# Patient Record
Sex: MALE | Employment: OTHER | URBAN - METROPOLITAN AREA
[De-identification: names, ages, dates, MRNs, and addresses within clinical notes are randomized per-mention and may not be internally consistent; named-entity substitution may affect disease eponyms.]

---

## 2018-03-28 ENCOUNTER — TELEPHONE (OUTPATIENT)
Dept: CARDIOTHORACIC SURGERY | Facility: CLINIC | Age: 78
End: 2018-03-28

## 2018-03-28 NOTE — TELEPHONE ENCOUNTER
An attempt was made to contact patient to collect demographic and insc information prior to visit. A message was left to contact our office.

## 2018-03-30 ENCOUNTER — TELEPHONE (OUTPATIENT)
Dept: SCHEDULING | Facility: CLINIC | Age: 78
End: 2018-03-30

## 2018-04-06 ENCOUNTER — OFFICE VISIT (OUTPATIENT)
Dept: CARDIOTHORACIC SURGERY | Facility: CLINIC | Age: 78
End: 2018-04-06
Payer: MEDICARE

## 2018-04-06 VITALS
OXYGEN SATURATION: 92 % | DIASTOLIC BLOOD PRESSURE: 17 MMHG | HEART RATE: 58 BPM | WEIGHT: 196 LBS | SYSTOLIC BLOOD PRESSURE: 128 MMHG

## 2018-04-06 DIAGNOSIS — I71.40 ABDOMINAL AORTIC ANEURYSM (AAA) WITHOUT RUPTURE (CMS/HCC): Primary | ICD-10-CM

## 2018-04-06 DIAGNOSIS — I71.40 ABDOMINAL AORTIC ANEURYSM (AAA) 3.0 CM TO 5.5 CM IN DIAMETER IN MALE (CMS/HCC): ICD-10-CM

## 2018-04-06 PROCEDURE — 99205 OFFICE O/P NEW HI 60 MIN: CPT | Performed by: THORACIC SURGERY (CARDIOTHORACIC VASCULAR SURGERY)

## 2018-04-06 RX ORDER — ASPIRIN 81 MG/1
81 TABLET ORAL DAILY
COMMUNITY

## 2018-04-06 RX ORDER — CETIRIZINE HYDROCHLORIDE 10 MG/1
10 TABLET ORAL DAILY
COMMUNITY

## 2018-04-06 RX ORDER — TRIAMCINOLONE ACETONIDE 1 MG/G
CREAM TOPICAL 2 TIMES DAILY
COMMUNITY

## 2018-04-06 RX ORDER — NITROGLYCERIN 0.4 MG/1
0.4 TABLET SUBLINGUAL EVERY 5 MIN PRN
COMMUNITY

## 2018-04-06 RX ORDER — SIMVASTATIN 40 MG/1
40 TABLET, FILM COATED ORAL NIGHTLY
COMMUNITY

## 2018-04-06 RX ORDER — IPRATROPIUM BROMIDE 21 UG/1
2 SPRAY, METERED NASAL
COMMUNITY

## 2018-04-06 NOTE — LETTER
April 10, 2018     Gary Bay MD  [address] [place] [address]    Patient: Gideon Harmon   YOB: 1940   Date of Visit: 4/6/2018       Dear Dr. Bay:    Thank you for referring Gideon Harmon to me for evaluation. Below are my notes for this consultation.    If you have questions, please do not hesitate to call me. I look forward to following your patient along with you.         Sincerely,        Magalys Lucas MD        CC: MD Paige Barnes PA C  4/10/2018  9:26 AM  Signed  Mr. Harmon is a 77-year-old male who presents to our aortic wellness clinic for evaluation of an incidentally found infrarenal abdominal aortic aneurysm which has now been followed for several years.  He follows regularly with his cardiologist, Dr. Soriano.  He denies any abdominal pain.  His past medical history significant for coronary artery bypass grafting ×4 performed in 2003, hypertension and hypercholesterolemia.  He has a family history significant for his father who had with the patient believes was an infected aortic stent graft.  His brother also had a prior AAA repair.  He currently denies any associated symptoms, including chest pain or upper back pain.  The patient wished to come see us today to have an opinion from an aortic specialist and for review of his latest CT scan which was performed on 6/27/2017.   Review of Systems   Constitution: Negative.   HENT: Negative.    Eyes: Negative.    Respiratory: Negative.    Endocrine: Negative.    Skin: Negative.    Musculoskeletal: Negative.    Gastrointestinal: Negative.    Genitourinary: Negative.    Neurological: Negative.    Psychiatric/Behavioral: Negative.    Allergic/Immunologic: Negative.      Physical Exam   Constitutional: He is oriented to person, place, and time. He appears well-developed and well-nourished. No distress.   HENT:   Head: Normocephalic and atraumatic.   Right Ear: External ear normal.   Left  Ear: External ear normal.   Nose: Nose normal.   Mouth/Throat: Oropharynx is clear and moist. No oropharyngeal exudate.   Eyes: Conjunctivae and EOM are normal. Pupils are equal, round, and reactive to light. Right eye exhibits no discharge. No scleral icterus.   Neck: Normal range of motion. Neck supple.   Cardiovascular: Normal rate, regular rhythm, S1 normal, S2 normal and intact distal pulses.    No murmur heard.  Pulmonary/Chest: Effort normal and breath sounds normal. No respiratory distress. He has no wheezes. He has no rales. He exhibits no tenderness.   Abdominal: Soft. Bowel sounds are normal. He exhibits no distension and no mass. There is no tenderness. There is no rebound and no guarding.   Musculoskeletal: Normal range of motion. He exhibits no edema, tenderness or deformity.   Neurological: He is alert and oriented to person, place, and time. He has normal reflexes.   Skin: Skin is warm and dry. No rash noted. No erythema. No pallor.   Psychiatric: He has a normal mood and affect. His behavior is normal. Judgment and thought content normal.       No Known Allergies      Current Outpatient Prescriptions   Medication Sig Dispense Refill   • aspirin 81 mg enteric coated tablet Take 81 mg by mouth daily.     • cetirizine (ZyrTEC) 10 mg tablet Take 10 mg by mouth daily.     • ipratropium (ATROVENT) 0.03 % nasal spray Administer 2 sprays into each nostril every 12 (twelve) hours.     • multivitamin tablet Take by mouth daily.     • nitroglycerin (NITROSTAT) 0.4 mg SL tablet Place 0.4 mg under the tongue every 5 (five) minutes as needed for chest pain.     • psyllium husk, aspartame, (METAMUCIL) 3.4 gram/5.8 gram powder Take by mouth daily.     • simvastatin (ZOCOR) 40 mg tablet Take 40 mg by mouth nightly.     • triamcinolone (KENALOG) 0.1 % cream Apply topically 2 (two) times a day.       No current facility-administered medications for this visit.            Impression/Plan: Mr. Harmon is a 77-year-old  male who has a past medical history of an abdominal aortic aneurysm measuring 4.5 cm.  We reviewed the imaging performed 6/27/2017.  This showed an infrarenal abdominal aortic aneurysm measuring 4.5 cm at maximum diameter.  There is no evidence of dissection.   We would like the patient to return in 1 year with repeat CTA of the chest, abdomen and pelvis.  Patient was advised to avoid any heavy lifting or straining and to continue maintaining good blood pressure control.  We advised that for any sudden onset of abdominal pain or chest pain radiating to the upper back the patient should report to the emergency room immediately and have our service contacted.  The patient was encouraged to contact us with any questions or concerns in the meantime.

## 2018-04-10 ASSESSMENT — ENCOUNTER SYMPTOMS
ALLERGIC/IMMUNOLOGIC NEGATIVE: 1
GASTROINTESTINAL NEGATIVE: 1
ENDOCRINE NEGATIVE: 1
RESPIRATORY NEGATIVE: 1
CONSTITUTIONAL NEGATIVE: 1
EYES NEGATIVE: 1
NEUROLOGICAL NEGATIVE: 1
PSYCHIATRIC NEGATIVE: 1
MUSCULOSKELETAL NEGATIVE: 1

## 2018-04-10 NOTE — PROGRESS NOTES
Mr. Harmon is a 77-year-old male who presents to our aortic wellness clinic for evaluation of an incidentally found infrarenal abdominal aortic aneurysm which has now been followed for several years.  He follows regularly with his cardiologist, Dr. Soriano.  He denies any abdominal pain.  His past medical history significant for coronary artery bypass grafting ×4 performed in 2003, hypertension and hypercholesterolemia.  He has a family history significant for his father who had with the patient believes was an infected aortic stent graft.  His brother also had a prior AAA repair.  He currently denies any associated symptoms, including chest pain or upper back pain.  The patient wished to come see us today to have an opinion from an aortic specialist and for review of his latest CT scan which was performed on 6/27/2017.   Review of Systems   Constitution: Negative.   HENT: Negative.    Eyes: Negative.    Respiratory: Negative.    Endocrine: Negative.    Skin: Negative.    Musculoskeletal: Negative.    Gastrointestinal: Negative.    Genitourinary: Negative.    Neurological: Negative.    Psychiatric/Behavioral: Negative.    Allergic/Immunologic: Negative.      Physical Exam   Constitutional: He is oriented to person, place, and time. He appears well-developed and well-nourished. No distress.   HENT:   Head: Normocephalic and atraumatic.   Right Ear: External ear normal.   Left Ear: External ear normal.   Nose: Nose normal.   Mouth/Throat: Oropharynx is clear and moist. No oropharyngeal exudate.   Eyes: Conjunctivae and EOM are normal. Pupils are equal, round, and reactive to light. Right eye exhibits no discharge. No scleral icterus.   Neck: Normal range of motion. Neck supple.   Cardiovascular: Normal rate, regular rhythm, S1 normal, S2 normal and intact distal pulses.    No murmur heard.  Pulmonary/Chest: Effort normal and breath sounds normal. No respiratory distress. He has no wheezes. He has no rales. He  exhibits no tenderness.   Abdominal: Soft. Bowel sounds are normal. He exhibits no distension and no mass. There is no tenderness. There is no rebound and no guarding.   Musculoskeletal: Normal range of motion. He exhibits no edema, tenderness or deformity.   Neurological: He is alert and oriented to person, place, and time. He has normal reflexes.   Skin: Skin is warm and dry. No rash noted. No erythema. No pallor.   Psychiatric: He has a normal mood and affect. His behavior is normal. Judgment and thought content normal.       No Known Allergies      Current Outpatient Prescriptions   Medication Sig Dispense Refill   • aspirin 81 mg enteric coated tablet Take 81 mg by mouth daily.     • cetirizine (ZyrTEC) 10 mg tablet Take 10 mg by mouth daily.     • ipratropium (ATROVENT) 0.03 % nasal spray Administer 2 sprays into each nostril every 12 (twelve) hours.     • multivitamin tablet Take by mouth daily.     • nitroglycerin (NITROSTAT) 0.4 mg SL tablet Place 0.4 mg under the tongue every 5 (five) minutes as needed for chest pain.     • psyllium husk, aspartame, (METAMUCIL) 3.4 gram/5.8 gram powder Take by mouth daily.     • simvastatin (ZOCOR) 40 mg tablet Take 40 mg by mouth nightly.     • triamcinolone (KENALOG) 0.1 % cream Apply topically 2 (two) times a day.       No current facility-administered medications for this visit.            Impression/Plan: Mr. Harmon is a 77-year-old male who has a past medical history of an abdominal aortic aneurysm measuring 4.5 cm.  We reviewed the imaging performed 6/27/2017.  This showed an infrarenal abdominal aortic aneurysm measuring 4.5 cm at maximum diameter.  There is no evidence of dissection.   We would like the patient to return in 1 year with repeat CTA of the chest, abdomen and pelvis.  Patient was advised to avoid any heavy lifting or straining and to continue maintaining good blood pressure control.  We advised that for any sudden onset of abdominal pain or chest  pain radiating to the upper back the patient should report to the emergency room immediately and have our service contacted.  The patient was encouraged to contact us with any questions or concerns in the meantime.

## 2021-01-29 DIAGNOSIS — Z23 ENCOUNTER FOR IMMUNIZATION: ICD-10-CM

## 2021-02-10 ENCOUNTER — IMMUNIZATIONS (OUTPATIENT)
Dept: FAMILY MEDICINE CLINIC | Facility: HOSPITAL | Age: 81
End: 2021-02-10

## 2021-02-10 DIAGNOSIS — Z23 ENCOUNTER FOR IMMUNIZATION: Primary | ICD-10-CM

## 2021-02-10 PROCEDURE — 91300 SARS-COV-2 / COVID-19 MRNA VACCINE (PFIZER-BIONTECH) 30 MCG: CPT

## 2021-02-10 PROCEDURE — 0001A SARS-COV-2 / COVID-19 MRNA VACCINE (PFIZER-BIONTECH) 30 MCG: CPT

## 2021-03-03 ENCOUNTER — IMMUNIZATIONS (OUTPATIENT)
Dept: FAMILY MEDICINE CLINIC | Facility: HOSPITAL | Age: 81
End: 2021-03-03

## 2021-03-03 DIAGNOSIS — Z23 ENCOUNTER FOR IMMUNIZATION: Primary | ICD-10-CM

## 2021-03-03 PROCEDURE — 0002A SARS-COV-2 / COVID-19 MRNA VACCINE (PFIZER-BIONTECH) 30 MCG: CPT

## 2021-03-03 PROCEDURE — 91300 SARS-COV-2 / COVID-19 MRNA VACCINE (PFIZER-BIONTECH) 30 MCG: CPT
